# Patient Record
Sex: MALE | Race: WHITE
[De-identification: names, ages, dates, MRNs, and addresses within clinical notes are randomized per-mention and may not be internally consistent; named-entity substitution may affect disease eponyms.]

---

## 2017-04-16 ENCOUNTER — HOSPITAL ENCOUNTER (EMERGENCY)
Dept: HOSPITAL 4 - SED | Age: 47
Discharge: HOME | End: 2017-04-16
Payer: MEDICAID

## 2017-04-16 VITALS — BODY MASS INDEX: 23.91 KG/M2 | WEIGHT: 167 LBS | HEIGHT: 70 IN

## 2017-04-16 VITALS — SYSTOLIC BLOOD PRESSURE: 140 MMHG
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
## 2017-04-16 DIAGNOSIS — S41.111A: Primary | ICD-10-CM
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
## 2017-04-16 DIAGNOSIS — I10: ICD-10-CM

## 2017-04-16 PROCEDURE — 99283 EMERGENCY DEPT VISIT LOW MDM: CPT

## 2017-04-16 PROCEDURE — 90471 IMMUNIZATION ADMIN: CPT

## 2017-04-16 PROCEDURE — 90715 TDAP VACCINE 7 YRS/> IM: CPT

## 2017-04-16 PROCEDURE — 12002 RPR S/N/AX/GEN/TRNK2.6-7.5CM: CPT

## 2017-04-30 ENCOUNTER — HOSPITAL ENCOUNTER (EMERGENCY)
Dept: HOSPITAL 4 - SED | Age: 47
Discharge: HOME | End: 2017-04-30
Payer: MEDICAID

## 2017-04-30 VITALS
TEMPERATURE: 98.3 F | RESPIRATION RATE: 18 BRPM | HEART RATE: 61 BPM | DIASTOLIC BLOOD PRESSURE: 88 MMHG | OXYGEN SATURATION: 97 % | SYSTOLIC BLOOD PRESSURE: 130 MMHG

## 2017-04-30 VITALS
TEMPERATURE: 98.3 F | RESPIRATION RATE: 18 BRPM | SYSTOLIC BLOOD PRESSURE: 120 MMHG | OXYGEN SATURATION: 97 % | DIASTOLIC BLOOD PRESSURE: 81 MMHG | HEART RATE: 62 BPM

## 2017-04-30 VITALS — WEIGHT: 180 LBS | HEIGHT: 68 IN | BODY MASS INDEX: 27.28 KG/M2

## 2017-04-30 DIAGNOSIS — S51.811D: ICD-10-CM
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
## 2017-04-30 DIAGNOSIS — L98.9: Primary | ICD-10-CM
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
## 2017-04-30 DIAGNOSIS — I10: ICD-10-CM
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
## 2017-04-30 PROCEDURE — 12002 RPR S/N/AX/GEN/TRNK2.6-7.5CM: CPT

## 2017-04-30 PROCEDURE — 99283 EMERGENCY DEPT VISIT LOW MDM: CPT

## 2017-05-14 ENCOUNTER — HOSPITAL ENCOUNTER (EMERGENCY)
Dept: HOSPITAL 4 - SED | Age: 47
Discharge: HOME | End: 2017-05-14
Payer: MEDICAID

## 2017-05-14 VITALS
DIASTOLIC BLOOD PRESSURE: 87 MMHG | SYSTOLIC BLOOD PRESSURE: 125 MMHG | HEART RATE: 76 BPM | OXYGEN SATURATION: 98 % | RESPIRATION RATE: 16 BRPM | TEMPERATURE: 98.3 F

## 2017-05-14 VITALS
SYSTOLIC BLOOD PRESSURE: 125 MMHG | OXYGEN SATURATION: 95 % | TEMPERATURE: 98.3 F | HEART RATE: 78 BPM | RESPIRATION RATE: 18 BRPM | DIASTOLIC BLOOD PRESSURE: 95 MMHG

## 2017-05-14 VITALS — WEIGHT: 185 LBS | BODY MASS INDEX: 27.4 KG/M2 | HEIGHT: 69 IN

## 2017-05-14 DIAGNOSIS — S51.811D: Primary | ICD-10-CM
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
## 2017-05-14 DIAGNOSIS — I10: ICD-10-CM
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
## 2019-07-07 ENCOUNTER — HOSPITAL ENCOUNTER (INPATIENT)
Dept: HOSPITAL 4 - SED | Age: 49
LOS: 1 days | Discharge: HOME | DRG: 203 | End: 2019-07-08
Attending: INTERNAL MEDICINE | Admitting: INTERNAL MEDICINE
Payer: MEDICAID

## 2019-07-07 VITALS — BODY MASS INDEX: 29.7 KG/M2 | HEIGHT: 68 IN | WEIGHT: 196 LBS

## 2019-07-07 VITALS — SYSTOLIC BLOOD PRESSURE: 142 MMHG

## 2019-07-07 DIAGNOSIS — Z79.899: ICD-10-CM

## 2019-07-07 DIAGNOSIS — I10: ICD-10-CM

## 2019-07-07 DIAGNOSIS — Z82.3: ICD-10-CM

## 2019-07-07 DIAGNOSIS — R07.89: Primary | ICD-10-CM

## 2019-07-07 DIAGNOSIS — Z87.891: ICD-10-CM

## 2019-07-07 DIAGNOSIS — Z79.82: ICD-10-CM

## 2019-07-07 LAB
ALBUMIN SERPL BCP-MCNC: 3.5 G/DL (ref 3.4–4.8)
ALT SERPL W P-5'-P-CCNC: 28 U/L (ref 12–78)
ANION GAP SERPL CALCULATED.3IONS-SCNC: 7 MMOL/L (ref 5–15)
AST SERPL W P-5'-P-CCNC: 23 U/L (ref 10–37)
BASOPHILS # BLD AUTO: 0 K/UL (ref 0–0.2)
BASOPHILS NFR BLD AUTO: 0.5 % (ref 0–2)
BILIRUB SERPL-MCNC: 0.2 MG/DL (ref 0–1)
BUN SERPL-MCNC: 15 MG/DL (ref 8–21)
CALCIUM SERPL-MCNC: 9 MG/DL (ref 8.4–11)
CHLORIDE SERPL-SCNC: 105 MMOL/L (ref 98–107)
CREAT SERPL-MCNC: 0.78 MG/DL (ref 0.55–1.3)
EOSINOPHIL # BLD AUTO: 0.2 K/UL (ref 0–0.4)
EOSINOPHIL NFR BLD AUTO: 2.1 % (ref 0–4)
ERYTHROCYTE [DISTWIDTH] IN BLOOD BY AUTOMATED COUNT: 13.7 % (ref 9–15)
GFR SERPL CREATININE-BSD FRML MDRD: 135 ML/MIN (ref 90–?)
GLUCOSE SERPL-MCNC: 92 MG/DL (ref 70–99)
HCT VFR BLD AUTO: 39.4 % (ref 36–54)
HGB BLD-MCNC: 13 G/DL (ref 14–18)
LIPASE SERPL-CCNC: 218 U/L (ref 73–393)
LYMPHOCYTES # BLD AUTO: 3.1 K/UL (ref 1–5.5)
LYMPHOCYTES NFR BLD AUTO: 39.6 % (ref 20.5–51.5)
MCH RBC QN AUTO: 30 PG (ref 27–31)
MCHC RBC AUTO-ENTMCNC: 33 % (ref 32–36)
MCV RBC AUTO: 92 FL (ref 79–98)
MONOCYTES # BLD MANUAL: 0.6 K/UL (ref 0–1)
MONOCYTES # BLD MANUAL: 7.6 % (ref 1.7–9.3)
NEUTROPHILS # BLD AUTO: 3.9 K/UL (ref 1.8–7.7)
NEUTROPHILS NFR BLD AUTO: 50.2 % (ref 40–70)
PLATELET # BLD AUTO: 238 K/UL (ref 130–430)
POTASSIUM SERPL-SCNC: 3.9 MMOL/L (ref 3.5–5.1)
RBC # BLD AUTO: 4.29 MIL/UL (ref 4.2–6.2)
SODIUM SERPLBLD-SCNC: 137 MMOL/L (ref 136–145)
WBC # BLD AUTO: 7.7 K/UL (ref 4.8–10.8)

## 2019-07-07 PROCEDURE — G0378 HOSPITAL OBSERVATION PER HR: HCPCS

## 2019-07-07 NOTE — NUR
Medication reconciliation completed with information provided by patient and 
family. Any prior medication reconciliation on file was reviewed and corrected.

## 2019-07-07 NOTE — NUR
Patient arrived via POV, AAOx4, and ambulatory with steady gait. Patient Macedonian 
speaking only, translation phone utilized for translation, family speaks very 
little english. Patient states left flank pain, and upper back pain. Notes 
onset was today. Patient states it is sharp and stabbing, with radiating. Rates 
pain at 4/10. Increased pain with movement. Patient calm and cooperative. 
Patient also notes feelings of anxiety. No chest pain, abdominal pain, nausea, 
vomiting, diarrhea. Will continue to follow up and monitor.

## 2019-07-08 VITALS — SYSTOLIC BLOOD PRESSURE: 146 MMHG

## 2019-07-08 VITALS — SYSTOLIC BLOOD PRESSURE: 127 MMHG

## 2019-07-08 VITALS — SYSTOLIC BLOOD PRESSURE: 138 MMHG

## 2019-07-08 VITALS — SYSTOLIC BLOOD PRESSURE: 145 MMHG

## 2019-07-08 VITALS — SYSTOLIC BLOOD PRESSURE: 122 MMHG

## 2019-07-08 VITALS — SYSTOLIC BLOOD PRESSURE: 139 MMHG

## 2019-07-08 LAB
ALBUMIN SERPL BCP-MCNC: 3.6 G/DL (ref 3.4–4.8)
ALT SERPL W P-5'-P-CCNC: 30 U/L (ref 12–78)
ANION GAP SERPL CALCULATED.3IONS-SCNC: 3 MMOL/L (ref 5–15)
APPEARANCE UR: CLEAR
AST SERPL W P-5'-P-CCNC: 21 U/L (ref 10–37)
BASOPHILS # BLD AUTO: 0 K/UL (ref 0–0.2)
BASOPHILS NFR BLD AUTO: 0.3 % (ref 0–2)
BILIRUB SERPL-MCNC: 0.2 MG/DL (ref 0–1)
BILIRUB UR QL STRIP: NEGATIVE
BUN SERPL-MCNC: 16 MG/DL (ref 8–21)
CALCIUM SERPL-MCNC: 9.3 MG/DL (ref 8.4–11)
CHLORIDE SERPL-SCNC: 106 MMOL/L (ref 98–107)
CHOLEST SERPL-MCNC: 176 MG/DL (ref ?–200)
COLOR UR: YELLOW
CREAT SERPL-MCNC: 0.69 MG/DL (ref 0.55–1.3)
EOSINOPHIL # BLD AUTO: 0.2 K/UL (ref 0–0.4)
EOSINOPHIL NFR BLD AUTO: 2.3 % (ref 0–4)
ERYTHROCYTE [DISTWIDTH] IN BLOOD BY AUTOMATED COUNT: 13.2 % (ref 9–15)
GFR SERPL CREATININE-BSD FRML MDRD: 156 ML/MIN (ref 90–?)
GLUCOSE SERPL-MCNC: 73 MG/DL (ref 70–99)
GLUCOSE UR STRIP-MCNC: NEGATIVE MG/DL
HCT VFR BLD AUTO: 40.3 % (ref 36–54)
HDLC SERPL-MCNC: 75 MG/DL (ref 45–?)
HGB BLD-MCNC: 13.1 G/DL (ref 14–18)
HGB UR QL STRIP: NEGATIVE
KETONES UR STRIP-MCNC: NEGATIVE MG/DL
LDL CHOLESTEROL: 82 MG/DL (ref ?–100)
LEUKOCYTE ESTERASE UR QL STRIP: NEGATIVE
LYMPHOCYTES # BLD AUTO: 3.4 K/UL (ref 1–5.5)
LYMPHOCYTES NFR BLD AUTO: 42.9 % (ref 20.5–51.5)
MCH RBC QN AUTO: 30 PG (ref 27–31)
MCHC RBC AUTO-ENTMCNC: 33 % (ref 32–36)
MCV RBC AUTO: 93 FL (ref 79–98)
MONOCYTES # BLD MANUAL: 0.5 K/UL (ref 0–1)
MONOCYTES # BLD MANUAL: 6.7 % (ref 1.7–9.3)
NEUTROPHILS # BLD AUTO: 3.8 K/UL (ref 1.8–7.7)
NEUTROPHILS NFR BLD AUTO: 47.8 % (ref 40–70)
NITRITE UR QL STRIP: NEGATIVE
PH UR STRIP: 6 [PH] (ref 5–8)
PLATELET # BLD AUTO: 236 K/UL (ref 130–430)
POTASSIUM SERPL-SCNC: 3.9 MMOL/L (ref 3.5–5.1)
PROT UR QL STRIP: NEGATIVE
RBC # BLD AUTO: 4.34 MIL/UL (ref 4.2–6.2)
SODIUM SERPLBLD-SCNC: 139 MMOL/L (ref 136–145)
SP GR UR STRIP: <1.005 (ref 1–1.03)
TRIGL SERPL-MCNC: 64 MG/DL (ref 30–150)
UROBILINOGEN UR STRIP-MCNC: 0.2 MG/DL (ref 0.2–1)
WBC # BLD AUTO: 8 K/UL (ref 4.8–10.8)

## 2019-07-08 RX ADMIN — Medication SCH ML: at 06:56

## 2019-07-08 RX ADMIN — Medication SCH ML: at 14:02

## 2019-07-08 RX ADMIN — Medication SCH ML: at 22:42

## 2019-07-08 NOTE — NUR
CONSULTATION PAGED/CALLED

Reason for Consultation: CHEST PAIN 

Person Who was Notified: [facility] 

Consulting Physician:  

Consultant Specialty: CARDIO

Ordering Physician:

## 2019-07-08 NOTE — NUR
Opening note

Patient sitting up in bed, A/Ox4, no SOB noted. Patient complaining of 2/10 chest pain, 
vital signs stable, no nausea, no vomiting. Patient states pain is tolerable. Iv patent, and 
intact. No complaints of dizziness. On Safety precautions, bed alarm on, bed in lowest 
position, 2 side rails up. Educated patient on the call light system, verbalized 
understanding. Call light within reach. patient in stable condition. Will continue to 
monitor.

## 2019-07-08 NOTE — NUR
Rounds

Patient sitting up in bed, no complaints of pain at this time. IV patent, intact, and 
flushed as ordered. No bleeding. No adverse side effects. No complaints of dizziness, no 
nausea, no vomiting.

## 2019-07-08 NOTE — NUR
COMMUNICATION WITH FAMILY 



PATIENTS DAUGHTER ORA BROWN IS AT BEDSIDE, IT WAS COMMUNICATED THAT DR. POWERS HAS PLACED 
ORDER THAT PATIENT CAN BE DISCHARGED HOME. SHE VERBALIZES THAT SHE WILL DRIVE HIM HOME 
HERSELF VIA PRIVATE VEHICLE.

## 2019-07-08 NOTE — NUR
PATIENT IN BED ASLEEP WITH NO PAIN OR RESPIRATORY DISTRESS. SPOUSE AT BEDSIDE. WILL CONT TO 
MONITOR.

## 2019-07-08 NOTE — NUR
Medications

All morning medications given as ordered. No adverse side effects noted. Patient complained 
of 2/10 chest discomfort, states it is tolerable. Vital signs stable. Iv patent, and intact.

## 2019-07-08 NOTE — NUR
Patient will be admitted to care of .  Admitted to Telemetry unit.  
Will go to room 114B.  Belongings list completed.  Summary report printed. 
Report will be given at bedside.

## 2019-07-08 NOTE — NUR
COMMUNICATION WITH DR. GIO POWERS HAS CALLED BACK AT THIS TIME, COMMUNICATION FROM AM NURSE LIBBY THAT PATIENT'S 
CONSULTS (DR. MUSA AND DR. PINEDA) HAVE CLEARED THE PATIENT FOR DISCHARGE. DR. POWERS HAS 
VERBALIZED DISCHARGE TO HOME ORDER AT THIS TIME. PATIENT AND HIS FAMILY WILL BE MADE AWARE 
IMMEDIATELY.

## 2019-07-08 NOTE — NUR
CONSULTATION PAGED/CALLED



Reason for Consultation: CHEST PAIN

Person Who was Notified: JOLANTA

Consulting Physician: DR. KERR

Consultant Specialty: CARDIO 

Ordering Physician: DR. FERNANDEZ

## 2019-07-08 NOTE — NUR
NOTE



PATIENT IS RESTING IN BED, STABLE, NO SIGNS OF RESPIRATORY DISTRESS. DAUGHTER IS AT BEDSIDE. 
PAPERWORK FOR DISCHARGE IS BEING PREPARED. BED IS LOCKED, AND AT THE LOWEST LEVEL.

## 2019-07-08 NOTE — NUR
INITIAL NOTE



AT INITIAL ASSESSMENT, PATIENT IS RESTING IN BED, STABLE, NO SIGNS OF RESPIRATORY DISTRESS. 
PATIENT VERBALIZES NO PAIN, NUMBNESS, TINGLING, OR SOB. PLAN OF CARE FOR THE EVENING IS 
COMMUNICATED WITH THE PATIENT. BED IS LOCKED, AND AT THE LOWEST LEVEL. FALL, SAFETY, AND 
RESPIRATORY PRECAUTIONS WILL BE IN PLACE THROUGHOUT THE SHIFT.

## 2019-07-08 NOTE — NUR
Rounds

patient sitting up in bed, family at bedside. No complaints of pain. Patient eating lunch, 
tolerating well. No nausea, no vomiting noted.

## 2019-07-08 NOTE — NUR
D/C Patient

Patient given medication reconciliation form and D/C instructions. Exit Care provided. 
Patient verbalized understanding. MD discussed with patient the results and treatment 
provided. Ambulatory with steady gait for discharge to home WITH HIS DAUGHTER VIA PRIVATE 
VEHICLE AT THIS TIME. Patient in stable condition, ID band removed. IV catheter removed, 
intact and dressing applied, no active bleeding. Patient educated on pain management. All 
belongings sent with patient.

## 2019-07-08 NOTE — NUR
Rounds

Patient resting in bed, no complaints of pain. No SOB. No chest pain noted. Patient in 
stable condition.

## 2019-07-08 NOTE — NUR
Closing note

Patient sitting up in bed, A/Ox4, no SOB noted. Vital signs stable, no nausea, no vomiting. 
Iv patent, and intact. No complaints of dizziness. On Safety precautions, bed alarm on, bed 
in lowest position, 2 side rails up. Educated patient on the call light system, verbalized 
understanding. Call light within reach. patient in stable condition. Patient would like to 
go home, patient cleared to go home from Dr. Vazquez, and Dr. Aldridge. Paged Dr. Davila.

## 2019-07-08 NOTE — NUR
ADMISSION NOTE

Received patient from ER via [facility], received report from  RN. Patient admitted with 
diagnosis of chest pain. Patient oriented to hospital routine, call light, toileting and 
safety-patient verbalized understanding.

## 2019-07-08 NOTE — NUR
CONSULTATION PAGED/CALLED

Reason for Consultation: RIGHT ARM NUMBNESS

Person Who was Notified: SPOKE WITH ESMER FROM  OFFICE.

Consulting Physician:  

Consultant Specialty: NEURO

Ordering Physician: DR POWERS

## 2022-06-10 NOTE — NUR
CLOSING NOTES

PATIENT IN BED RESTING WITH SPOUSE AT BED SIDE. NO COMPLAINTS OF CHEST PAIN OR RESPIRATORY 
DISTRESS. BED LOCKED AND IN LOW POSITION AND ALL NEEDS HAVE BEEN MET. CALL LIGHT WITHIN 
REACH AND WILL ENDORSE CARE TO ONCOMING SHIFT. Quality 110: Preventive Care And Screening: Influenza Immunization: Influenza Immunization Administered during Influenza season Detail Level: Detailed